# Patient Record
Sex: FEMALE | Race: BLACK OR AFRICAN AMERICAN
[De-identification: names, ages, dates, MRNs, and addresses within clinical notes are randomized per-mention and may not be internally consistent; named-entity substitution may affect disease eponyms.]

---
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
## 2017-06-13 NOTE — ER DOCUMENT REPORT
ED Suture/Wound Recheck





- General


Chief Complaint: Suture Removal


Stated Complaint: SUTURE REMOVAL/LEG


Time Seen by Provider: 17 15:22


Notes: 


Patient is a 31-year-old female who presents for a wound recheck and suture 

removal.  She had a right leg laceration repaired 8 days ago.  Denies discharge

, fevers, redness or swelling around the wound.


TRAVEL OUTSIDE OF THE [place] IN LAST 30 DAYS: No





- Related Data


Allergies/Adverse Reactions: 


 





No Known Allergies Allergy (Verified 17 14:04)


 











Past Medical History





- General


Information source: Patient





- Social History


Smoking Status: Unknown if Ever Smoked


Family History: None


Patient has suicidal ideation: No


Patient has homicidal ideation: No


Neurological Medical History: Reports: Hx Migraine


Renal/ Medical History: Denies: Hx Peritoneal Dialysis


Past Surgical History: Reports: Hx  Section - x3





- Immunizations


Hx Diphtheria, Pertussis, Tetanus Vaccination: Yes





Review of Systems





- Review of Systems


Notes: 


REVIEW OF SYSTEMS:


CONSTITUTIONAL: -fevers, -chills


EENT: -eye pain, -difficulty swallowing, -nasal congestion


CARDIOVASCULAR:-chest pain, -syncope.


RESPIRATORY: -cough, -SOB


GASTROINTESTINAL:  -abdominal pain, - nausea, -vomiting, -diarrhea


GENITOURINARY: -dysuria, -hematuria


MUSCULOSKELETAL: -back pain, -neck pain


SKIN: +right leg laceration


HEMATOLOGIC: -easy bruising or bleeding.


LYMPHATIC: -swollen, enlarged glands.


NEUROLOGICAL: -altered mental status or loss of consciousness, -headache, -

neurologic symptoms


PSYCHIATRIC: -anxiety, -depression.


ALL OTHER SYSTEMS REVIEWED AND NEGATIVE.





Physical Exam





- Vital signs


Vitals: 





 











Temp Pulse Resp BP Pulse Ox


 


 98.3 F   78   16   129/67 H  99 


 


 17 14:04  17 14:04  17 14:04  17 14:04  17 14:04














- Notes


Notes: 


PHYSICAL EXAMINATION:





GENERAL: Well-appearing, well-nourished and in no acute distress.





HEAD: Atraumatic, normocephalic.





EYES: Pupils equal round and reactive to light, extraocular movements intact, 

sclera anicteric, conjunctiva are normal.





ENT: nares patent, oropharynx clear without exudates.  Moist mucous membranes.





NECK: Normal range of motion, supple without lymphadenopathy





LUNGS: Breath sounds clear to auscultation bilaterally and equal.  No wheezes 

rales or rhonchi.





HEART: Regular rate and rhythm without murmurs





ABDOMEN: Soft, nontender, normoactive bowel sounds.  No guarding, no rebound.  

No masses appreciated.





EXTREMITIES: Normal range of motion, no pitting or edema.  No cyanosis.





NEUROLOGICAL: Cranial nerves grossly intact.  Normal speech, normal gait.  

Normal sensory and motor exams.





PSYCH: Normal mood, normal affect.





SKIN: Well-healed right leg laceration with 5 sutures, no discharge/redness/

swelling





Course





- Re-evaluation


Re-evalutation: 


Wound appears well healed without evidence of infection.  Sutures removed.





- Vital Signs


Vital signs: 





 











Temp Pulse Resp BP Pulse Ox


 


 98.3 F   78   16   129/67 H  99 


 


 17 14:04  17 14:04  17 14:04  17 14:04  17 14:04














Discharge





- Discharge


Clinical Impression: 


 Visit for suture removal





Condition: Good


Disposition: HOME, SELF-CARE


Additional Instructions: 


Continue to apply bacitracin to the wound.

## 2017-06-14 NOTE — ER DOCUMENT REPORT
ED Wound





- General


Chief Complaint: Wound Recheck


Stated Complaint: OPEN WOUND ON LEG


Time Seen by Provider: 17 14:55


Notes: 


Patient is a 31-year-old female who presents emergency department complaining 

of right leg wound dehiscence that started yesterday after her sutures were 

removed in the emergency department.  She admits to pain to palpation, no 

bleeding but minimal drainage.  No evidence of purulent drainage.  Denies any 

fevers or chills.  Able to ambulate without any difficulty.  Has been taking 

Motrin for pain


TRAVEL OUTSIDE OF THE [place] IN LAST 30 DAYS: No





- Related Data


Allergies/Adverse Reactions: 


 





No Known Allergies Allergy (Verified 17 14:04)


 











Past Medical History





- Social History


Smoking Status: Current Every Day Smoker


Chew tobacco use (# tins/day): No


Frequency of alcohol use: None


Drug Abuse: None


Family History: None


Patient has suicidal ideation: No


Patient has homicidal ideation: No


Neurological Medical History: Reports: Hx Migraine


Renal/ Medical History: Denies: Hx Peritoneal Dialysis


Past Surgical History: Reports: Hx  Section - x3





- Immunizations


Hx Diphtheria, Pertussis, Tetanus Vaccination: Yes





Review of Systems





- Review of Systems


Constitutional: No symptoms reported


Skin: See HPI


-: Yes All other systems reviewed and negative





Physical Exam





- Vital signs


Vitals: 


 











Temp Pulse Resp BP Pulse Ox


 


 98.4 F   72   18   111/58 L  99 


 


 17 14:09  17 14:09  17 14:09  17 14:09  17 14:09














- General


General appearance: Appears well


In distress: None





- Extremities


General upper extremity: Normal inspection, Normal ROM


General lower extremity: Normal inspection, Nontender, Normal color, Normal ROM

, Normal strength, Normal temperature, Normal weight bearing.  No: Joaquina's sign





- Skin


Skin Temperature: Warm


Skin Moisture: Dry


Skin Color: Normal


Skin Turgor: Elastic


Skin irregularity: Laceration - Duration with evidence of wound dehiscence no 

evidence of purulent drainage but minimal surrounding cellulitis.





Course





- Re-evaluation


Re-evalutation: 





17 19:27


Is a 31-year-old female presents with cellulitis around the wound status 

dehiscing.  Discussed with patient at sutures no longer indicated that  the 

wound will heal by secondary intention.  Will discharge patient home on p.o. 

antibiotics giving surrounding cellulitis which she states is developed since 

she had stitches in.  Discussed strict return precautions and patient is 

agreeable with plan.





- Vital Signs


Vital signs: 


 











Temp Pulse Resp BP Pulse Ox


 


 97.4 F   55 L  20   109/60   100 


 


 17 16:07  17 16:07  17 16:07  17 16:07  17 16:07














Discharge





- Discharge


Clinical Impression: 


 Wound dehiscence





Condition: Good


Disposition: HOME, SELF-CARE


Instructions:  Delayed Wound Closure ([facility]), Cephalexin ([facility]), Cellulitis ([facility])


Prescriptions: 


Cephalexin Monohydrate [Keflex 500 mg Capsule] 500 mg PO QID #20 capsule

## 2018-05-17 NOTE — ER DOCUMENT REPORT
ED Medical Screen (RME)





- General


Chief Complaint: Abdominal Pain


Stated Complaint: FLU LIKE SYMPTOMS


Time Seen by Provider: 18 10:59


Mode of Arrival: Ambulatory


Information source: Patient, [facility] Records


Notes: 





32-year-old female with no reported past medical history presents with 

complaint of subjective fever, right flank pain and multiple episodes of 

vomiting that started 3 days prior to arrival.  Patient states that she has 

been unable to tolerate fluids or food.  Does admit to dysuria.  She denies any 

vaginal discharge concern for STD.  She does have a history of urinary tract 

infections.  Patient denies any blood in her emesis.











I have greeted and performed a rapid medical assessment of the patient.  A 

comprehensive evaluation and assessment will be performed by another ED 

provider.  Medical decision making, lab review/xrays if performed will be 

reviewed by the ED provider assuming care of the patient.











PHYSICAL EXAMINATION:





GENERAL: Well-appearing, well-nourished and in no acute distress.





HEAD: Atraumatic, normocephalic.





EYES: Pupils equal round extraocular movements intact,  conjunctiva are normal.





ENT: Nares patent





NECK: Normal range of motion





LUNGS: No respiratory distress





Musculoskeletal: Right CVA tenderness





NEUROLOGICAL:  Normal speech, normal gait. 





PSYCH: Normal mood, normal affect.





SKIN: Warm, Dry, normal turgor, no rashes or lesions noted.


TRAVEL OUTSIDE OF THE [place] IN LAST 30 DAYS: No





- HPI


Onset: Other - 2 days prior to arrival


Onset/Duration: Persistent, Worse


Quality of pain: Achy


Associated Symptoms: Chills, Fever, Nausea, Sweating, Vomiting.  denies: 

Vaginal bleeding


Exacerbated by: Denies


Relieved by: Denies


Similar symptoms previously: Yes


Recently seen / treated by doctor: No





- Related Data


Smoking: Other - 3 days ago


Frequency of alcohol use: None


Drug Abuse: None


Allergies/Adverse Reactions: 


 





No Known Allergies Allergy (Verified 18 09:39)


 











Past Medical History





- Social History


Chew tobacco use (# tins/day): No


Frequency of alcohol use: None


Drug Abuse: None


Family history: Reviewed & Not Pertinent


Neurological Medical History: Reports: Hx Migraine


Renal/ Medical History: Denies: Hx Peritoneal Dialysis


Past Surgical History: Reports: Hx  Section - x3





- Immunizations


Hx Diphtheria, Pertussis, Tetanus Vaccination: Yes





Physical Exam





- Vital signs


Vitals: 





 











Temp Pulse Resp BP Pulse Ox


 


 99.4 F   116 H  24 H  104/66   99 


 


 18 10:06  18 10:06  18 10:06  18 10:06  18 10:06














Course





- Vital Signs


Vital signs: 





 











Temp Pulse Resp BP Pulse Ox


 


 99.4 F   116 H  24 H  104/66   99 


 


 18 10:06  18 10:06  18 10:06  18 10:06  18 10:06

## 2018-05-17 NOTE — ER DOCUMENT REPORT
ED GI/





- General


Chief Complaint: Abdominal Pain


Stated Complaint: FLU LIKE SYMPTOMS


Time Seen by Provider: 18 10:59


Mode of Arrival: Ambulatory


Information source: Patient


Notes: 





Patient presents with a 3 day history of right flank pain and dysuria.  Patient 

reports nausea and vomiting 5 episodes today.  Patient reports chills at home 

and suspects she may have had a fever.  Patient is concerned she may have a UTI.


TRAVEL OUTSIDE OF THE [place] IN LAST 30 DAYS: No





- HPI


Patient complains to provider of: Abdominal pain, Dysuria, Flank pain, 

Vomiting.  No: Pelvic pain, Pregnant


Onset: Other - 3 days


Timing/Duration: Persistent


Quality of pain: Achy


Pain Level: 4


Location: Left flank, Right flank, Suprapubic


Vaginal bleeding (Compared to normal period): Spotting


Associated symptoms: Chills, Dysuria, Nausea, Urinary frequency, Vomiting


Exacerbated by: Denies


Relieved by: Denies


Similar symptoms previously: Yes


Recently seen / treated by doctor: No





- Related Data


Allergies/Adverse Reactions: 


 





No Known Allergies Allergy (Verified 18 09:39)


 











Past Medical History





- General


Information source: Patient, [facility] Records





- Social History


Smoking Status: Current Every Day Smoker


Chew tobacco use (# tins/day): No


Smoking Education Provided: Yes


Frequency of alcohol use: None


Drug Abuse: None


Occupation: None


Family History: None


Patient has suicidal ideation: No


Patient has homicidal ideation: No





- Medical History


Medical History: Negative


Neurological Medical History: Reports: Hx Migraine


Renal/ Medical History: Denies: Hx Peritoneal Dialysis


Past Surgical History: Reports: Hx  Section - x3





- Immunizations


Hx Diphtheria, Pertussis, Tetanus Vaccination: Yes





Review of Systems





- Review of Systems


Constitutional: Chills


EENT: No symptoms reported


Cardiovascular: No symptoms reported.  denies: Chest pain


Respiratory: No symptoms reported.  denies: Cough, Short of breath


Gastrointestinal: Abdominal pain, Nausea, Vomiting.  denies: Diarrhea, Poor 

appetite


Genitourinary: Dysuria, Flank pain


Female Genitourinary: No symptoms reported.  denies: Pregnant, Vaginal discharge

, Vaginal bleeding


Musculoskeletal: Back pain


Skin: No symptoms reported


Hematologic/Lymphatic: No symptoms reported


Neurological/Psychological: No symptoms reported





Physical Exam





- Vital signs


Vitals: 


 











Temp Pulse Resp BP Pulse Ox


 


 99.4 F   116 H  24 H  104/66   99 


 


 18 10:06  18 10:06  18 10:06  18 10:06  18 10:06














- General


General appearance: Appears well, Alert


In distress: None





- HEENT


Head: Normocephalic, Atraumatic


Eyes: Normal


Nasal: Normal


Mouth/Lips: Normal


Mucous membranes: Normal


Neck: Normal, Supple.  No: Lymphadenopathy





- Respiratory


Respiratory status: No respiratory distress


Chest status: Nontender


Breath sounds: Normal


Chest palpation: Normal





- Cardiovascular


Rhythm: Tachycardia


Heart sounds: S1 appreciated, S2 appreciated


Murmur: No





- Abdominal


Inspection: Normal


Distension: No distension


Bowel sounds: Normal


Tenderness: Tender - Right lateral side.  No: McBurney's point, Gerard's sign


Organomegaly: No organomegaly





- Back


Back: CVA tenderness - Bilateral





- Extremities


General upper extremity: Normal inspection, Normal ROM


General lower extremity: Normal inspection, Normal ROM





- Neurological


Neuro grossly intact: Yes


Cognition: Normal


[place] Coma Scale Eye Opening: Spontaneous


[place] Coma Scale Verbal: Oriented


[place] Coma Scale Motor: Obeys Commands


[place] Coma Scale Total: 15





- Psychological


Associated symptoms: Normal affect, Normal mood





- Skin


Skin Temperature: Warm


Skin Moisture: Dry


Skin Color: Normal





Course





- Re-evaluation


Re-evalutation: 





18 13:26


Patient reports that flank pain abdominal pain is improved at this time.  

Patient denies any nausea.  Second liter of IV fluids infusing.


18 14:00


Patient's vital signs normalized.  Patient nontoxic in appearance.  Discussed 

patient's diagnostic tests with her.  Patient encouraged to see her primary 

doctor to have her electrolytes rechecked as her potassium was mildly decreased 

today.  Patient with findings concerning for pyelonephritis.  No concern for 

sepsis at this time.  Patient with normalized vital signs, no fever, no 

elevation in lactic acid.


18 17:57








- Vital Signs


Vital signs: 


 











Temp Pulse Resp BP Pulse Ox


 


 98.1 F   80   18   110/60   100 


 


 18 15:22  18 15:22  18 15:22  18 15:22  18 15:22














- Laboratory


Result Diagrams: 


 18 11:55





 18 11:55


Laboratory results interpreted by me: 


 











  18





  11:09 11:55 11:55


 


WBC   16.4 H 


 


Seg Neuts % (Manual)   84 H 


 


Band Neutrophils %   1 L 


 


Lymphocytes % (Manual)   8 L 


 


Abs Neuts (Manual)   13.9 H 


 


PT    15.5 H


 


Potassium   


 


Glucose   


 


Direct Bilirubin   


 


AST   


 


Urine Protein  >=500 H  


 


Urine Glucose (UA)  50 H  


 


Urine Ketones  TRACE H  


 


Urine Blood  MODERATE H  


 


Urine Urobilinogen  4.0 H  


 


Ur Leukocyte Esterase  SMALL H  














  18





  11:55


 


WBC 


 


Seg Neuts % (Manual) 


 


Band Neutrophils % 


 


Lymphocytes % (Manual) 


 


Abs Neuts (Manual) 


 


PT 


 


Potassium  3.3 L


 


Glucose  140 H


 


Direct Bilirubin  0.6 H


 


AST  41 H


 


Urine Protein 


 


Urine Glucose (UA) 


 


Urine Ketones 


 


Urine Blood 


 


Urine Urobilinogen 


 


Ur Leukocyte Esterase 














 Labs- Entire Visit











  18





  11:09 11:55 11:55


 


WBC   16.4 H 


 


RBC   4.11 


 


Hgb   13.5 


 


Hct   39.7 


 


MCV   97 


 


MCH   32.9 


 


MCHC   34.1 


 


RDW   14.0 


 


Plt Count   158 


 


Total Counted   100 


 


Seg Neutrophils %   Not Reportable 


 


Seg Neuts % (Manual)   84 H 


 


Band Neutrophils %   1 L 


 


Lymphocytes %   Not Reportable 


 


Lymphocytes % (Manual)   8 L 


 


Monocytes %   Not Reportable 


 


Monocytes % (Manual)   7 


 


Eosinophils %   Not Reportable 


 


Eosinophils % (Manual)   0 


 


Basophils %   Not Reportable 


 


Basophils % (Manual)   0 


 


Absolute Neutrophils   Not Reportable 


 


Abs Neuts (Manual)   13.9 H 


 


Absolute Lymphocytes   Not Reportable 


 


Abs Lymphs (Manual)   1.3 


 


Absolute Monocytes   Not Reportable 


 


Abs Monocytes (Manual)   1.1 


 


Absolute Eosinophils   Not Reportable 


 


Absolute Eos (Manual)   0.0 


 


Absolute Basophils   Not Reportable 


 


Abs Basophils (Manual)   0.0 


 


Toxic Vacuolation   PRESENT 


 


Platelet Comment   ADEQUATE 


 


RBC Morph Comment   NORMO-CYTIC/CHROMIC 


 


PT    15.5 H


 


INR    1.17


 


VBG pH   


 


VBG pCO2   


 


VBG HCO3   


 


VBG Base Excess   


 


Sodium   


 


Potassium   


 


Chloride   


 


Carbon Dioxide   


 


Anion Gap   


 


BUN   


 


Creatinine   


 


Est GFR ( Amer)   


 


Est GFR (Non-Af Amer)   


 


Glucose   


 


Lactic Acid   


 


Calcium   


 


Total Bilirubin   


 


Direct Bilirubin   


 


Neonat Total Bilirubin   


 


Neonat Direct Bilirubin   


 


Neonat Indirect Bili   


 


AST   


 


ALT   


 


Alkaline Phosphatase   


 


Total Protein   


 


Albumin   


 


Urine Color  DARK YELLOW  


 


Urine Appearance  CLOUDY  


 


Urine pH  5.0  


 


Ur Specific Gravity  1.019  


 


Urine Protein  >=500 H  


 


Urine Glucose (UA)  50 H  


 


Urine Ketones  TRACE H  


 


Urine Blood  MODERATE H  


 


Urine Nitrite  NEGATIVE  


 


Urine Bilirubin  NEGATIVE  


 


Urine Urobilinogen  4.0 H  


 


Ur Leukocyte Esterase  SMALL H  


 


Urine WBC (Auto)  >182  


 


Urine RBC (Auto)  51  


 


U Hyaline Cast (Auto)  17  


 


Urine Bacteria (Auto)  TRACE  


 


Urine WBC Clumps  MOD  


 


Squamous Epi Cells Auto  34  


 


U Non-Squamous Epis Auto  2  


 


Urine Mucus (Auto)  MANY  


 


Urine Ascorbic Acid  NEGATIVE  


 


Urine HCG, Qual  NEGATIVE  














  18





  11:55 11:55 11:55


 


WBC   


 


RBC   


 


Hgb   


 


Hct   


 


MCV   


 


MCH   


 


MCHC   


 


RDW   


 


Plt Count   


 


Total Counted   


 


Seg Neutrophils %   


 


Seg Neuts % (Manual)   


 


Band Neutrophils %   


 


Lymphocytes %   


 


Lymphocytes % (Manual)   


 


Monocytes %   


 


Monocytes % (Manual)   


 


Eosinophils %   


 


Eosinophils % (Manual)   


 


Basophils %   


 


Basophils % (Manual)   


 


Absolute Neutrophils   


 


Abs Neuts (Manual)   


 


Absolute Lymphocytes   


 


Abs Lymphs (Manual)   


 


Absolute Monocytes   


 


Abs Monocytes (Manual)   


 


Absolute Eosinophils   


 


Absolute Eos (Manual)   


 


Absolute Basophils   


 


Abs Basophils (Manual)   


 


Toxic Vacuolation   


 


Platelet Comment   


 


RBC Morph Comment   


 


PT   


 


INR   


 


VBG pH    7.42


 


VBG pCO2    36.8


 


VBG HCO3    23.3


 


VBG Base Excess    -0.8


 


Sodium  137.2  


 


Potassium  3.3 L  


 


Chloride  101  


 


Carbon Dioxide  22  


 


Anion Gap  14  


 


BUN  17  


 


Creatinine  1.03  


 


Est GFR ( Amer)  > 60  


 


Est GFR (Non-Af Amer)  > 60  


 


Glucose  140 H  


 


Lactic Acid   1.1 


 


Calcium  9.0  


 


Total Bilirubin  0.7  


 


Direct Bilirubin  0.6 H  


 


Neonat Total Bilirubin  Not Reportable  


 


Neonat Direct Bilirubin  Not Reportable  


 


Neonat Indirect Bili  Not Reportable  


 


AST  41 H  


 


ALT  31  


 


Alkaline Phosphatase  80  


 


Total Protein  7.3  


 


Albumin  4.1  


 


Urine Color   


 


Urine Appearance   


 


Urine pH   


 


Ur Specific Gravity   


 


Urine Protein   


 


Urine Glucose (UA)   


 


Urine Ketones   


 


Urine Blood   


 


Urine Nitrite   


 


Urine Bilirubin   


 


Urine Urobilinogen   


 


Ur Leukocyte Esterase   


 


Urine WBC (Auto)   


 


Urine RBC (Auto)   


 


U Hyaline Cast (Auto)   


 


Urine Bacteria (Auto)   


 


Urine WBC Clumps   


 


Squamous Epi Cells Auto   


 


U Non-Squamous Epis Auto   


 


Urine Mucus (Auto)   


 


Urine Ascorbic Acid   


 


Urine HCG, Qual   














Discharge





- Discharge


Clinical Impression: 


 Pyelonephritis, Hypokalemia





Condition: Stable


Disposition: HOME, SELF-CARE


Instructions:  Antibiotic Therapy ([facility]), Hypokalemia ([facility]), Pyelonephritis ([facility])


Additional Instructions: 


Return immediately for any new or worsening symptoms





Followup with your primary care provider, call tomorrow to make a followup 

appointment





Your potassium was mildly decreased today.  You will need to have this test 

rechecked, call your doctor today to make an appointment for follow-up.


Prescriptions: 


Cephalexin Monohydrate [Keflex 500 mg Capsule] 500 mg PO Q8 7 Days #21 capsule


Ondansetron HCl [Zofran 4 mg Tablet] 1 - 2 tab PO Q6 PRN #15 tablet


 PRN Reason: 


Forms:  Smoking Cessation Education


Referrals: 


BRANDIE YOON DO [NO LOCAL MD] - Follow up as needed


[facility] - Follow up as needed

## 2019-09-30 ENCOUNTER — HOSPITAL ENCOUNTER (EMERGENCY)
Dept: HOSPITAL 62 - ER | Age: 34
Discharge: HOME | End: 2019-09-30
Payer: MEDICARE

## 2019-09-30 VITALS — DIASTOLIC BLOOD PRESSURE: 64 MMHG | SYSTOLIC BLOOD PRESSURE: 110 MMHG

## 2019-09-30 DIAGNOSIS — F17.200: ICD-10-CM

## 2019-09-30 DIAGNOSIS — S61.213A: Primary | ICD-10-CM
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
## 2019-09-30 DIAGNOSIS — Z23: ICD-10-CM

## 2019-09-30 PROCEDURE — 90471 IMMUNIZATION ADMIN: CPT

## 2019-09-30 PROCEDURE — 90715 TDAP VACCINE 7 YRS/> IM: CPT

## 2019-09-30 PROCEDURE — 12042 INTMD RPR N-HF/GENIT2.6-7.5: CPT

## 2019-09-30 PROCEDURE — 99283 EMERGENCY DEPT VISIT LOW MDM: CPT

## 2019-09-30 PROCEDURE — 73140 X-RAY EXAM OF FINGER(S): CPT

## 2019-09-30 NOTE — ER DOCUMENT REPORT
HPI





- HPI


Patient complains to provider of: Finger laceration


Time Seen by Provider: 19 15:13


Onset: Just prior to arrival


Onset/Duration: Sudden


Quality of pain: Sharp


Context: 





Patient states she was attempting to open her car door and has a care being her 

as a key chain.  Patient states her finger got caught on the care being her and 

when she jerked her arm back she fell on the ground with the care being her 

stuck in her finger.  Patient with laceration to left third finger.


Exacerbated by: Movement


Relieved by: Denies


Similar symptoms previously: No


Recently seen / treated by doctor: No





- ROS


ROS below otherwise negative: Yes


Systems Reviewed and Negative: Yes All other systems reviewed and negative





- NEURO


Neurology: DENIES: Weakness





- GASTROINTESTINAL


Gastrointestinal: DENIES: Nausea, Patient vomiting





- REPRODUCTIVE


Reproductive: DENIES: Pregnant:





- MUSCULOSKELETAL


Musculoskeletal: REPORTS: Extremity pain





- DERM


Skin Color: Normal


Skin Problems: Laceration





Past Medical History





- General


Information source: Patient





- Social History


Smoking Status: Current Every Day Smoker


Smoking Education Provided: Yes


Frequency of alcohol use: None


Drug Abuse: None


Occupation: None


Family History: None





- Medical History


Medical History: Other - Mild learning disability


Neurological Medical History: Reports: Hx Migraine


Renal/ Medical History: Denies: Hx Peritoneal Dialysis


Past Surgical History: Reports: Hx  Section - x3





- Immunizations


Hx Diphtheria, Pertussis, Tetanus Vaccination: Yes





Vertical Provider Document





- CONSTITUTIONAL


Agree With Documented VS: Yes


Exam Limitations: No Limitations


General Appearance: WD/WN, No Apparent Distress





- INFECTION CONTROL


TRAVEL OUTSIDE OF THE [place] IN LAST 30 DAYS: No





- HEENT


HEENT: Atraumatic, Normocephalic





- NECK


Neck: Normal Inspection





- RESPIRATORY


Respiratory: Breath Sounds Normal, No Respiratory Distress





- CARDIOVASCULAR


Cardiovascular: Regular Rate, Regular Rhythm


Pulses: Normal: Radial





- MUSCULOSKELETAL/EXTREMETIES


Musculoskeletal/Extremeties: MAEW, FROM, Tender - Left third finger tenderness 

with laceration





- NEURO


Level of Consciousness: Awake, Alert, Appropriate


Motor/Sensory: No Motor Deficit





- DERM


Integumentary: Warm, Dry, Laceration - 7 cm Irregular laceration, partial 

degloving to the palmar surface of left third finger





Course





- Vital Signs


Vital signs: 


                                        











Temp Pulse Resp BP Pulse Ox


 


 97.9 F   109 H  18   112/76   98 


 


 19 14:57  19 14:57  19 14:57  19 14:57  19 14:57














- Diagnostic Test


Radiology reviewed: Reports reviewed





Procedures





- Laceration/Wound Repair


  ** Left Finger 3rd digit


Wound length (cm): 7


Wound's Depth, Shape: Irregular


Laceration pre-procedure: Shur-Clens applied


Anesthetic type: 1% Lidocaine


Wound explored: Contaminated, Foreign body removed


Wound Debrided: Minimal


Wound Repaired With: Sutures


Suture Size/Type: 5:0, Nylon


Number of Sutures: 15


Layer Closure?: No


Post-procedure wound care: Sterile dressing applied


Post-procedure NV exam normal: Yes


Complications: No


Hands front picture: 


                            __________________________














                            __________________________





 1 - irregular 7 cm partial degloving








Discharge





- Discharge


Clinical Impression: 


Finger laceration


Qualifiers:


 Encounter type: initial encounter Finger: middle finger Damage to nail status: 

without damage Foreign body presence: with foreign body Laterality: left 

Qualified Code(s): S61.223A - Laceration with foreign body of left middle finger

without damage to nail, initial encounter





Condition: Stable


Disposition: HOME, SELF-CARE


Instructions:  Laceration Care ([facility]), Prophylactic Antibiotic ([facility]), Tetanus 

Immunization Given ([facility])


Additional Instructions: 


Return immediately for any new or worsening symptoms : fever, redness, drainage,

increased swelling, increased pain or any concerning symptoms





Followup with your primary care provider, call tomorrow to make a followup 

appointment





Suture removal in 10 days





Follow-up with orthopedic doctor for any persistent problems


Prescriptions: 


Cephalexin Monohydrate [Keflex 500 mg Capsule] 500 mg PO Q6H 5 Days  capsule


Forms:  Smoking Cessation Education


Referrals: 


MATTHEW MULLIGAN DO [ACTIVE STAFF] - Follow up as needed

## 2019-09-30 NOTE — RADIOLOGY REPORT (SQ)
EXAM DESCRIPTION:  FINGER LEFT



COMPLETED DATE/TIME:  9/30/2019 3:56 pm



REASON FOR STUDY:  fall, L 3rd finger lac



COMPARISON:  None.



NUMBER OF VIEWS:  Three views.



TECHNIQUE:  AP, lateral, and oblique images acquired of the left third finger.



LIMITATIONS:  None.



FINDINGS:  MINERALIZATION: Normal.

BONES: No acute fracture or dislocation.  No worrisome bone lesions.

SOFT TISSUES: Soft tissue laceration of the volar soft tissues of the left 3rd finger.

OTHER: No other significant finding.



IMPRESSION:  No fracture or dislocation of the left 3rd finger.  No radiopaque foreign body.



TECHNICAL DOCUMENTATION:  JOB ID:  [id number]

 2011 [employer]- All Rights Reserved



Reading location - IP/workstation name: [email or web address]

## 2019-10-12 ENCOUNTER — HOSPITAL ENCOUNTER (EMERGENCY)
Dept: HOSPITAL 62 - ER | Age: 34
Discharge: HOME | End: 2019-10-12
Payer: MEDICARE

## 2019-10-12 VITALS — DIASTOLIC BLOOD PRESSURE: 49 MMHG | SYSTOLIC BLOOD PRESSURE: 92 MMHG

## 2019-10-12 DIAGNOSIS — F17.200: ICD-10-CM

## 2019-10-12 DIAGNOSIS — S61.213D: Primary | ICD-10-CM
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
## 2019-10-12 PROCEDURE — 99281 EMR DPT VST MAYX REQ PHY/QHP: CPT

## 2019-10-12 NOTE — ER DOCUMENT REPORT
HPI





- HPI


Patient complains to provider of: Suture removal


Time Seen by Provider: 10/12/19 18:41


Onset: Other - 2 weeks ago


Onset/Duration: Better


Quality of pain: Achy


Pain Level: 1


Context: 





Patient presents for suture removal to the left third finger.  Patient states 

that she has continued pain and is unable to fully extend the finger.  Patient 

denies following up with orthopedics as she was previously advised to do.  

Patient denies any new injury to the finger.  Patient denies any fever.


Associated Symptoms: Other - Left third finger tenderness.  denies: Fever


Exacerbated by: Movement


Relieved by: Denies


Similar symptoms previously: No


Recently seen / treated by doctor: Yes





- ROS


ROS below otherwise negative: Yes


Systems Reviewed and Negative: Yes All other systems reviewed and negative





- CONSTITUTIONAL


Constitutional: DENIES: Fever, Chills





- NEURO


Neurology: DENIES: Weakness





- REPRODUCTIVE


Reproductive: DENIES: Pregnant:





- MUSCULOSKELETAL


Musculoskeletal: REPORTS: Extremity pain, Swelling





- DERM


Skin Problems: Laceration - Sutured laceration to finger





Past Medical History





- General


Information source: Patient





- Social History


Smoking Status: Current Every Day Smoker


Smoking Education Provided: Yes


Frequency of alcohol use: None


Drug Abuse: None


Lives with: Family


Family History: None


Neurological Medical History: Reports: Hx Migraine


Renal/ Medical History: Denies: Hx Peritoneal Dialysis


Past Surgical History: Reports: Hx  Section - x3





- Immunizations


Hx Diphtheria, Pertussis, Tetanus Vaccination: Yes





Vertical Provider Document





- CONSTITUTIONAL


Agree With Documented VS: Yes


Exam Limitations: No Limitations


General Appearance: WD/WN, No Apparent Distress





- INFECTION CONTROL


TRAVEL OUTSIDE OF THE [place] IN LAST 30 DAYS: No





- HEENT


HEENT: Atraumatic, Normocephalic





- NECK


Neck: Normal Inspection





- RESPIRATORY


Respiratory: Breath Sounds Normal, No Respiratory Distress





- CARDIOVASCULAR


Cardiovascular: Regular Rate, Regular Rhythm


Pulses: Normal: Radial





- MUSCULOSKELETAL/EXTREMETIES


Musculoskeletal/Extremeties: Tender - Left third finger tenderness


Notes: 





Patient complains of continued tenderness to the left third thing finger with 1+

edema.  Patient states that she is unable to fully extend the finger although is

able to flex finger more readily.  Patient without any injury to the dorsal 

aspect of the finger.  Wound edges approximated, no erythema.





- NEURO


Level of Consciousness: Awake, Alert, Appropriate





- DERM


Integumentary: Warm, Dry, Laceration - Sutured laceration to left third finger 

with a 15 intact sutures





Course





- Re-evaluation


Re-evalutation: 





10/12/19 18:51


Wound edges approximated, no surrounding erythema.  Patient does have some 

swelling with some decreased range of motion.  Patient encouraged to follow-up 

with hand surgeon for further evaluation at this time.  No concern for septic 

arthritis or tenosynovitis.





- Vital Signs


Vital signs: 


                                        











Temp Pulse Resp BP Pulse Ox


 


 98.3 F   67   14   92/49 L  99 


 


 10/12/19 18:12  10/12/19 18:12  10/12/19 18:12  10/12/19 18:12  10/12/19 18:12














Discharge





- Discharge


Clinical Impression: 


 Encounter for removal of sutures





Condition: Stable


Disposition: HOME, SELF-CARE


Instructions:  Suture Removal


Additional Instructions: 


Return immediately for any new or worsening symptoms





Followup with your primary care provider, call tomorrow to make a followup 

appointment





Follow-up with a hand surgeon, Dr. Mulligan for a recheck, call their office on 

Monday morning for an appointment.


Referrals: 


MATTHEW MULLIGAN DO [ACTIVE STAFF] - 10/14/19